# Patient Record
Sex: FEMALE | Race: WHITE
[De-identification: names, ages, dates, MRNs, and addresses within clinical notes are randomized per-mention and may not be internally consistent; named-entity substitution may affect disease eponyms.]

---

## 2017-09-24 ENCOUNTER — HOSPITAL ENCOUNTER (EMERGENCY)
Dept: HOSPITAL 45 - C.EDB | Age: 32
Discharge: HOME | End: 2017-09-24
Payer: COMMERCIAL

## 2017-09-24 VITALS
BODY MASS INDEX: 21.72 KG/M2 | WEIGHT: 127.21 LBS | HEIGHT: 64.02 IN | HEIGHT: 64.02 IN | BODY MASS INDEX: 21.72 KG/M2 | WEIGHT: 127.21 LBS

## 2017-09-24 VITALS — OXYGEN SATURATION: 97 %

## 2017-09-24 VITALS — SYSTOLIC BLOOD PRESSURE: 105 MMHG | HEART RATE: 104 BPM | OXYGEN SATURATION: 94 % | DIASTOLIC BLOOD PRESSURE: 60 MMHG

## 2017-09-24 VITALS — OXYGEN SATURATION: 98 % | HEART RATE: 83 BPM

## 2017-09-24 VITALS — TEMPERATURE: 99.32 F

## 2017-09-24 DIAGNOSIS — Z83.6: ICD-10-CM

## 2017-09-24 DIAGNOSIS — J45.909: ICD-10-CM

## 2017-09-24 DIAGNOSIS — Z84.1: ICD-10-CM

## 2017-09-24 DIAGNOSIS — Z83.3: ICD-10-CM

## 2017-09-24 DIAGNOSIS — J43.9: ICD-10-CM

## 2017-09-24 DIAGNOSIS — F17.210: ICD-10-CM

## 2017-09-24 DIAGNOSIS — Z80.9: ICD-10-CM

## 2017-09-24 DIAGNOSIS — J40: Primary | ICD-10-CM

## 2017-09-24 DIAGNOSIS — N28.9: ICD-10-CM

## 2017-09-24 DIAGNOSIS — M48.00: ICD-10-CM

## 2017-09-24 LAB
ALBUMIN/GLOB SERPL: 1.2 {RATIO} (ref 0.9–2)
ALP SERPL-CCNC: 44 U/L (ref 45–117)
ALT SERPL-CCNC: 15 U/L (ref 12–78)
ANION GAP SERPL CALC-SCNC: 8 MMOL/L (ref 3–11)
AST SERPL-CCNC: 12 U/L (ref 15–37)
BASOPHILS # BLD: 0.03 K/UL (ref 0–0.2)
BASOPHILS NFR BLD: 0.2 %
BUN SERPL-MCNC: 9 MG/DL (ref 7–18)
BUN/CREAT SERPL: 7.2 (ref 10–20)
CALCIUM SERPL-MCNC: 9.7 MG/DL (ref 8.5–10.1)
CHLORIDE SERPL-SCNC: 109 MMOL/L (ref 98–107)
CO2 SERPL-SCNC: 25 MMOL/L (ref 21–32)
COMPLETE: YES
CREAT CL PREDICTED SERPL C-G-VRATE: 57.1 ML/MIN
CREAT SERPL-MCNC: 1.2 MG/DL (ref 0.6–1.2)
EOSINOPHIL NFR BLD AUTO: 231 K/UL (ref 130–400)
GLOBULIN SER-MCNC: 3.3 GM/DL (ref 2.5–4)
GLUCOSE SERPL-MCNC: 93 MG/DL (ref 70–99)
HCT VFR BLD CALC: 40.7 % (ref 37–47)
IG%: 0.3 %
IMM GRANULOCYTES NFR BLD AUTO: 15.1 %
LYMPHOCYTES # BLD: 2.21 K/UL (ref 1.2–3.4)
MCH RBC QN AUTO: 32.2 PG (ref 25–34)
MCHC RBC AUTO-ENTMCNC: 34.2 G/DL (ref 32–36)
MCV RBC AUTO: 94.2 FL (ref 80–100)
MONOCYTES NFR BLD: 5.1 %
NEUTROPHILS # BLD AUTO: 0.6 %
NEUTROPHILS NFR BLD AUTO: 78.7 %
PMV BLD AUTO: 9.7 FL (ref 7.4–10.4)
POTASSIUM SERPL-SCNC: 4 MMOL/L (ref 3.5–5.1)
RBC # BLD AUTO: 4.32 M/UL (ref 4.2–5.4)
SODIUM SERPL-SCNC: 142 MMOL/L (ref 136–145)
WBC # BLD AUTO: 14.59 K/UL (ref 4.8–10.8)

## 2017-09-24 NOTE — DIAGNOSTIC IMAGING REPORT
CHEST ONE VIEW PORTABLE



CLINICAL HISTORY: Pt c/o SOB dyspnea



COMPARISON STUDY:  No previous studies for comparison.



FINDINGS: The bones soft tissues and hemidiaphragms are normal. The

cardiomediastinal silhouette is normal. The lungs are clear. The pulmonary

vasculature is normal. 



IMPRESSION:  Negative chest. 











The above report was generated using voice recognition software.  It may contain

grammatical, syntax or spelling errors.









Electronically signed by:  Abhay Jon M.D.

9/24/2017 9:22 PM



Dictated Date/Time:  9/24/2017 9:22 PM

## 2017-09-24 NOTE — EMERGENCY ROOM VISIT NOTE
History


Report prepared by Wilfredo:  Barney Mayers


Under the Supervision of:  Dr. Nam Mullins M.D.


First contact with patient:  20:47


Chief Complaint:  CONGESTION


Stated Complaint:  HARD TO BREATH, BURNING IN CHEST, SINUSES, COUGH





History of Present Illness


The patient is a 32 year old female who presents to the Emergency Room with 

complaints of constant congestion that began 2 weeks ago. She rates her 

discomfort as an 8/10 in severity. The patient states that she has been  

experiencing coughing, chest pain, and reports that it is painful to breathe. 

The patient describes her chest pain as a burning sensation. She states that 

there is a "terrible taste in my mouth". The patient denies a productive cough. 

She admits to a history of asthma and emphysema, but denies any steroids.





   Source of History:  patient


   Onset:  2 weeks ago


   Position:  other (global)


   Symptom Intensity:  8/10


   Timing:  constant


   Associated Symptoms:  + cough, + chest pain, + SOB





Review of Systems


See HPI for pertinent positives & negatives. A total of 10 systems reviewed and 

were otherwise negative.





Past Medical & Surgical


Medical Problems:


(1) Asthma


(2) Bronchitis


(3) Emphysema of lung


(4) Kidney disease


(5) Spinal stenosis


Surgical Problems:


(1) H/O tubal ligation








Family History





Cancer


Diabetes mellitus


Kidney disease


Kidney stones


Lung disease





Social History


Smoking Status:  Current Every Day Smoker


Alcohol Use:  occasionally


Marital Status:  


Housing Status:  lives with significant other


Occupation Status:  employed





Current/Historical Medications


Scheduled


Azithromycin (Zithromax), 250 MG PO DAILY


Prednisone (Prednisone Tab), 0 PO DAILY





Allergies


Coded Allergies:  


     Ciprofloxacin (Unverified  Allergy, Intermediate, HIVES, BREATHING 

DIFFICULTIES, BLOTCHES, 9/24/17)


     Prochlorperazine (Unverified  Allergy, Intermediate, ANXIOUS/ SEE COMMENT 

PLEASE, 9/24/17)


 PATIENT SAYS SHE CAN ONLY BE GIVEN THIS IF IT IS GIVEN


 ALONG WITH BENADRYL. OTHERWISE, SHE GETS BAD ANXIETY.


     Gabapentin (Unverified  Adverse Reaction, Intermediate, EMOTIONAL, CRYING 

UNCONTROLLABLY, 9/24/17)





Physical Exam


Vital Signs











  Date Time  Temp Pulse Resp B/P (MAP) Pulse Ox O2 Delivery O2 Flow Rate FiO2


 


9/24/17 23:10  104 18 105/60 94   


 


9/24/17 22:33  120 20 108/57 98 Room Air  


 


9/24/17 21:49  83 15  98 Room Air  


 


9/24/17 21:34  89      


 


9/24/17 21:33     97 Room Air  


 


9/24/17 21:33     97 Room Air  


 


9/24/17 20:50     97 Room Air  


 


9/24/17 20:40 37.4 105 18 120/69 97 Room Air  











Physical Exam


GENERAL: Patient is a healthy-appearing well-nourished 32 year old female.


HEAD: Normocephalic atraumatic


EYES: Ocular movements intact pupils equal and react to light


OROPHARYNX mucous membranes are moist no exudates present no erythema or edema 

present


NECK: Supple no nuchal rigidity


CHEST: Good equal expansion


LUNGS: Bilateral wheezing present.


CARDIAC: Normal S1 and S2


ABDOMEN: Soft nontender no guarding


BACK: No CVA tenderness


EXTREMITIES: No pain upon palpation normal muscle strength in all groups no 

clubbing cyanosis or edema


NEURO: Patient is following commands and answering questions appropriately. 

Alert and oriented x3 Cranial Nerves 2-12 grossly intact





Medical Decision & Procedures


ER Provider


Diagnostic Interpretation:


X-ray results as stated below per interpretation by me and the radiologist: 





CHEST ONE VIEW PORTABLE





CLINICAL HISTORY: Pt c/o SOB dyspnea





COMPARISON STUDY:  No previous studies for comparison.





FINDINGS: The bones soft tissues and hemidiaphragms are normal. The


cardiomediastinal silhouette is normal. The lungs are clear. The pulmonary


vasculature is normal. 





IMPRESSION:  Negative chest. 

















The above report was generated using voice recognition software.  It may contain


grammatical, syntax or spelling errors.














Electronically signed by:  Abhay Jon M.D.


9/24/2017 9:22 PM





Dictated Date/Time:  9/24/2017 9:22 PM





Laboratory Results


9/24/17 21:25








Red Blood Count 4.32, Mean Corpuscular Volume 94.2, Mean Corpuscular Hemoglobin 

32.2, Mean Corpuscular Hemoglobin Concent 34.2, Mean Platelet Volume 9.7, 

Neutrophils (%) (Auto) 78.7, Lymphocytes (%) (Auto) 15.1, Monocytes (%) (Auto) 

5.1, Eosinophils (%) (Auto) 0.6, Basophils (%) (Auto) 0.2, Neutrophils # (Auto) 

11.48, Lymphocytes # (Auto) 2.21, Monocytes # (Auto) 0.74, Eosinophils # (Auto) 

0.09, Basophils # (Auto) 0.03





9/24/17 21:25

















Test


  9/24/17


21:25


 


White Blood Count


  14.59 K/uL


(4.8-10.8)


 


Red Blood Count


  4.32 M/uL


(4.2-5.4)


 


Hemoglobin


  13.9 g/dL


(12.0-16.0)


 


Hematocrit 40.7 % (37-47) 


 


Mean Corpuscular Volume


  94.2 fL


()


 


Mean Corpuscular Hemoglobin


  32.2 pg


(25-34)


 


Mean Corpuscular Hemoglobin


Concent 34.2 g/dl


(32-36)


 


Platelet Count


  231 K/uL


(130-400)


 


Mean Platelet Volume


  9.7 fL


(7.4-10.4)


 


Neutrophils (%) (Auto) 78.7 % 


 


Lymphocytes (%) (Auto) 15.1 % 


 


Monocytes (%) (Auto) 5.1 % 


 


Eosinophils (%) (Auto) 0.6 % 


 


Basophils (%) (Auto) 0.2 % 


 


Neutrophils # (Auto)


  11.48 K/uL


(1.4-6.5)


 


Lymphocytes # (Auto)


  2.21 K/uL


(1.2-3.4)


 


Monocytes # (Auto)


  0.74 K/uL


(0.11-0.59)


 


Eosinophils # (Auto)


  0.09 K/uL


(0-0.5)


 


Basophils # (Auto)


  0.03 K/uL


(0-0.2)


 


RDW Standard Deviation


  43.3 fL


(36.4-46.3)


 


RDW Coefficient of Variation


  12.6 %


(11.5-14.5)


 


Immature Granulocyte % (Auto) 0.3 % 


 


Immature Granulocyte # (Auto)


  0.04 K/uL


(0.00-0.02)


 


Anion Gap


  8.0 mmol/L


(3-11)


 


Est Creatinine Clear Calc


Drug Dose 57.1 ml/min 


 


 


Estimated GFR (


American) 68.3 


 


 


Estimated GFR (Non-


American 58.9 


 


 


BUN/Creatinine Ratio 7.2 (10-20) 


 


Calcium Level


  9.7 mg/dl


(8.5-10.1)


 


Total Bilirubin


  0.2 mg/dl


(0.2-1)


 


Aspartate Amino Transf


(AST/SGOT) 12 U/L (15-37) 


 


 


Alanine Aminotransferase


(ALT/SGPT) 15 U/L (12-78) 


 


 


Alkaline Phosphatase


  44 U/L


()


 


Total Protein


  7.1 gm/dl


(6.4-8.2)


 


Albumin


  3.8 gm/dl


(3.4-5.0)


 


Globulin


  3.3 gm/dl


(2.5-4.0)


 


Albumin/Globulin Ratio 1.2 (0.9-2) 





Labs reviewed by ED physician.





Medications Administered











 Medications


  (Trade)  Dose


 Ordered  Sig/Duy


 Route  Start Time


 Stop Time Status Last Admin


Dose Admin


 


 Albuterol/


 Ipratropium


  (Duoneb)  12 ml  ONE  ONCE


 INH  9/24/17 21:15


 9/24/17 21:16 DC 9/24/17 21:48


12 ML


 


 Methylprednisolone


 Sodium Succinate


  (Solu-Medrol IV)  125 mg  NOW  STAT


 IV  9/24/17 21:01


 9/24/17 21:04 DC 9/24/17 21:25


125 MG


 


 Azithromycin


  (Zithromax Tab)  500 mg  NOW  STAT


 PO  9/24/17 21:01


 9/24/17 21:04 DC 9/24/17 21:25


500 MG


 


 Sodium Chloride  1,000 ml @ 


 999 mls/hr  Q1H1M STAT


 IV  9/24/17 21:01


 9/24/17 22:01 DC 9/24/17 21:25


999 MLS/HR


 


 Ketorolac


 Tromethamine


  (Toradol Inj)  30 mg  NOW  STAT


 IV  9/24/17 21:01


 9/24/17 21:04 DC 9/24/17 21:25


30 MG











ECG


Indication:  chest pain


Rate (beats per minute):  88


Rhythm:  normal sinus


Findings:  no acute ischemic change, no ectopy





ED Course


2054: Past medical records reviewed. The patient was evaluated in room B02. A 

complete history and physical examination was performed. 





2101: Ordered Toradol Injection 30 mg IV, Sodium Chloride 1000 ml @ 999 mls/hr 

IV, Azithromycin 500 mg PO, Solu-Medrol  mg IV.





2115: Ordered Duoneb 12 ml INH.





2253: I reevaluated the patient and she is resting comfortably.





Medical Decision


The differential diagnosis includes etiologies such as infections, reactive 

airway disease, pneumonia, pneumothorax, COPD, CHF, cardiac ischemia, pulmonary 

embolism, musculoskeletal, gastrointestinal, as well as others were entertained.


This is a 32-year-old female that presents emergency department complaining of 

breath.  The patient is a history of COPD.  An IV was established, patient 

given Solu-Medrol as well as an hour-long breathing treatment.  Repeat exam 

reveals improvement patient's symptoms.  I do believe that the patient can be 

discharged home on a prednisone taper.  Patient was in agreement with the 

treatment plan.





Medication Reconcilliation


Current Medication List:  was personally reviewed by me





Blood Pressure Screening


Patient's blood pressure:  Normal blood pressure





Impression





 Primary Impression:  


 Bronchitis





Scribe Attestation


The scribe's documentation has been prepared under my direction and personally 

reviewed by me in its entirety. I confirm that the note above accurately 

reflects all work, treatment, procedures, and medical decision making performed 

by me.





Departure Information


Dispostion


Home / Self-Care





Prescriptions





Azithromycin (ZITHROMAX) 250 Mg Tab


250 MG PO DAILY, #4 TAB


   Prov: Nam Mullins MD         9/24/17 


Prednisone (Prednisone Tab) 20 Mg Tab


0 PO DAILY, #7 TAB


   2 TABS DAILY FOR 2 DAYS, THEN 1 TAB DAILY FOR 2 DAYS, THEN 1/2 TAB


   DAILY FOR 2 DAYS.


   Prov: Nam Mullins MD         9/24/17





Referrals


No Doctor, Assigned (PCP)





Patient Instructions


My [facility]

## 2017-10-02 ENCOUNTER — HOSPITAL ENCOUNTER (EMERGENCY)
Dept: HOSPITAL 45 - C.EDB | Age: 32
Discharge: LEFT BEFORE DISCHARGE | End: 2017-10-02
Payer: COMMERCIAL

## 2017-10-02 VITALS
HEIGHT: 64.02 IN | WEIGHT: 123.46 LBS | BODY MASS INDEX: 21.08 KG/M2 | BODY MASS INDEX: 21.08 KG/M2 | HEIGHT: 64.02 IN | WEIGHT: 123.46 LBS

## 2017-10-02 VITALS
DIASTOLIC BLOOD PRESSURE: 67 MMHG | HEART RATE: 97 BPM | TEMPERATURE: 97.7 F | OXYGEN SATURATION: 95 % | SYSTOLIC BLOOD PRESSURE: 111 MMHG

## 2017-10-02 DIAGNOSIS — R07.81: Primary | ICD-10-CM

## 2018-02-04 ENCOUNTER — HOSPITAL ENCOUNTER (EMERGENCY)
Dept: HOSPITAL 45 - C.EDB | Age: 33
Discharge: HOME | End: 2018-02-04
Payer: COMMERCIAL

## 2018-02-04 VITALS — SYSTOLIC BLOOD PRESSURE: 114 MMHG | HEART RATE: 99 BPM | DIASTOLIC BLOOD PRESSURE: 70 MMHG | OXYGEN SATURATION: 97 %

## 2018-02-04 VITALS
BODY MASS INDEX: 19.91 KG/M2 | WEIGHT: 116.62 LBS | HEIGHT: 64.02 IN | BODY MASS INDEX: 19.91 KG/M2 | WEIGHT: 116.62 LBS | HEIGHT: 64.02 IN

## 2018-02-04 VITALS — TEMPERATURE: 98.42 F

## 2018-02-04 DIAGNOSIS — J45.909: ICD-10-CM

## 2018-02-04 DIAGNOSIS — Z83.3: ICD-10-CM

## 2018-02-04 DIAGNOSIS — R51: ICD-10-CM

## 2018-02-04 DIAGNOSIS — R11.2: Primary | ICD-10-CM

## 2018-02-04 DIAGNOSIS — Z98.51: ICD-10-CM

## 2018-02-04 DIAGNOSIS — R05: ICD-10-CM

## 2018-02-04 DIAGNOSIS — R19.7: ICD-10-CM

## 2018-02-04 DIAGNOSIS — Z84.1: ICD-10-CM

## 2018-02-04 DIAGNOSIS — J02.9: ICD-10-CM

## 2018-02-04 DIAGNOSIS — J43.9: ICD-10-CM

## 2018-02-04 NOTE — EMERGENCY ROOM VISIT NOTE
History


Report prepared by Wilfredo:  Lencho Hoyt


Under the Supervision of:  Dr. Nam Roche D.O.


First contact with patient:  20:29


Chief Complaint:  FLU LIKE SX


Stated Complaint:  FLU LIKE SX





History of Present Illness


The patient is a 32 year old female who presents to the Emergency Room with 

complaints of flu like symptoms that began 5 days ago. The patient complains of 

nausea, vomiting, diarrhea, cough, sore throat, and headache. The patient 

reports sick contacts at home and at work.





   Source of History:  patient


   Onset:  5 days ago


   Position:  other (global)


   Timing:  constant


   Associated Symptoms:  + headache, + sorethroat, + cough, + nausea, + vomiting

, + diarrhea





Review of Systems


See HPI for pertinent positives & negatives. A total of 10 systems reviewed and 

were otherwise negative.





Past Medical & Surgical


Medical Problems:


(1) Asthma


(2) Bronchitis


(3) Emphysema of lung


(4) Kidney disease


(5) Spinal stenosis


Surgical Problems:


(1) H/O tubal ligation








Family History





Cancer


Diabetes mellitus


Kidney disease


Kidney stones


Lung disease





Social History


Smoking Status:  Never Smoker


Alcohol Use:  occasionally


Marital Status:  


Housing Status:  lives with significant other


Occupation Status:  employed





Current/Historical Medications


Scheduled


Prednisone (Prednisone Tab), 0 PO DAILY





Allergies


Coded Allergies:  


     Ciprofloxacin (Unverified  Allergy, Intermediate, HIVES, BREATHING 

DIFFICULTIES, BLOTCHES, 9/24/17)


     Prochlorperazine (Unverified  Allergy, Intermediate, ANXIOUS/ SEE COMMENT 

PLEASE, 9/24/17)


 PATIENT SAYS SHE CAN ONLY BE GIVEN THIS IF IT IS GIVEN


 ALONG WITH BENADRYL. OTHERWISE, SHE GETS BAD ANXIETY.


     Gabapentin (Unverified  Adverse Reaction, Intermediate, EMOTIONAL, CRYING 

UNCONTROLLABLY, 9/24/17)





Physical Exam


Vital Signs











  Date Time  Temp Pulse Resp B/P (MAP) Pulse Ox O2 Delivery O2 Flow Rate FiO2


 


2/4/18 20:10 36.9 104 18 109/75 98 Room Air  











Physical Exam


CONSTITUTIONAL/VITAL SIGNS: Reviewed / noted above.


GENERAL: Non-toxic in appearance. 


INTEGUMENTARY: Warm, dry, and Pink.


HEAD: Normocephalic.


EYES: without scleral icterus or trauma.


ENT/OROPHARYNX: clear and moist.


LYMPHADENOPATHY/NECK: Is supple without lymphadenopathy or meningismus.


RESPIRATORY: Lungs clear and equal.


CARDIOVASCULAR: Regular rate and rhythm.


GI/ABDOMEN: Soft and nontender. No organomegaly or pulsatile mass. No rebound 

or guarding. Normal bowel sounds.


EXTREMITIES: Warm and well perfused.


BACK: No CVA tenderness.


NEUROLOGICAL: Intact without focal deficits. 


PSYCHIATRIC: normal affect.


MUSCULOSKELETAL: Normally developed with good muscle tone.





Medical Decision & Procedures


ED Course


2029: Previous medical records were reviewed. The patient was evaluated in room 

B7. A complete history and physical examination was performed.





2050: On reevaluation, the patient is doing well. I discussed the results and 

findings with the patient. She verbalized agreement of the treatment plan. She 

was discharged home.





Medical Decision


Differential includes viral illness, influenza, streptococcal pharyngitis, 

meningitis, pneumonia, sinusitis, UTI, pyelonephritis, otitis media.





This is a 32-year-old female who presents to the ED with a chief complaint of 

flulike illness.  The patient reports that she had nausea, vomiting and 

diarrhea for the first 3 days of the symptoms.  She is currently on day 5.  She 

reports a sore throat some left ear pain, body aches, headache and cough.  The 

patient states that she has called off work and number of days and requires an 

excuse for work.  She was offered a flu swab but declined it because she is not 

a candidate for Tamiflu.  The patient does not when any additional testing.  

She is felt to be stable for discharge.





Medication Reconcilliation


Current Medication List:  was personally reviewed by me





Blood Pressure Screening


Patient's blood pressure:  Normal blood pressure


Blood pressure disposition:  Did not require urgent referral





Impression





 Primary Impression:  


 Influenza-like symptoms





Scribe Attestation


The scribe's documentation has been prepared under my direction and personally 

reviewed by me in its entirety. I confirm that the note above accurately 

reflects all work, treatment, procedures, and medical decision making performed 

by me.





Departure Information


Dispostion


Home / Self-Care





Referrals


No Doctor, Assigned (PCP)





Forms


HOME CARE DOCUMENTATION FORM,                                                 

               IMPORTANT VISIT INFORMATION, Work Instructions   Return To Work:

  1 day





Patient Instructions


My [facility]





Additional Instructions





Follow-up with your doctor for further care and evaluation in 1-2 days.  Return 

to the emergency department for worsening or new symptoms or any concerns.


You have been examined and treated today on an emergency basis only. This is 

not a substitute for, or an effort to provide, complete comprehensive medical 

care. It is impossible to recognize and treat all injuries or illnesses in a 

single emergency department visit. It is therefore important that you follow up 

closely with your doctor.  Call as soon as possible for an appointment.





Take over-the-counter cold and flu medication for your symptoms.